# Patient Record
Sex: FEMALE | Race: WHITE | NOT HISPANIC OR LATINO | URBAN - METROPOLITAN AREA
[De-identification: names, ages, dates, MRNs, and addresses within clinical notes are randomized per-mention and may not be internally consistent; named-entity substitution may affect disease eponyms.]

---

## 2022-10-08 ENCOUNTER — HOSPITAL ENCOUNTER (EMERGENCY)
Facility: HOSPITAL | Age: 22
Discharge: HOME | End: 2022-10-08
Attending: EMERGENCY MEDICINE
Payer: COMMERCIAL

## 2022-10-08 ENCOUNTER — APPOINTMENT (OUTPATIENT)
Dept: RADIOLOGY | Facility: HOSPITAL | Age: 22
End: 2022-10-08
Payer: COMMERCIAL

## 2022-10-08 VITALS
BODY MASS INDEX: 30.53 KG/M2 | HEIGHT: 66 IN | WEIGHT: 190 LBS | TEMPERATURE: 98.6 F | HEART RATE: 97 BPM | OXYGEN SATURATION: 98 % | RESPIRATION RATE: 20 BRPM | SYSTOLIC BLOOD PRESSURE: 155 MMHG | DIASTOLIC BLOOD PRESSURE: 83 MMHG

## 2022-10-08 DIAGNOSIS — S93.401A RIGHT ANKLE SPRAIN: Primary | ICD-10-CM

## 2022-10-08 PROCEDURE — 73610 X-RAY EXAM OF ANKLE: CPT

## 2022-10-08 PROCEDURE — 99283 EMERGENCY DEPT VISIT LOW MDM: CPT

## 2022-10-08 PROCEDURE — 99284 EMERGENCY DEPT VISIT MOD MDM: CPT | Performed by: EMERGENCY MEDICINE

## 2022-10-08 RX ORDER — ACETAMINOPHEN 325 MG/1
975 TABLET ORAL ONCE
Status: COMPLETED | OUTPATIENT
Start: 2022-10-08 | End: 2022-10-08

## 2022-10-08 RX ORDER — IBUPROFEN 600 MG/1
600 TABLET ORAL ONCE
Status: COMPLETED | OUTPATIENT
Start: 2022-10-08 | End: 2022-10-08

## 2022-10-08 RX ADMIN — IBUPROFEN 600 MG: 600 TABLET ORAL at 19:05

## 2022-10-08 RX ADMIN — ACETAMINOPHEN 975 MG: 325 TABLET, FILM COATED ORAL at 19:05

## 2022-10-08 NOTE — ED PROVIDER NOTES
History  Chief Complaint   Patient presents with   • Ankle Injury     Brad Lesterwers down 2 steps, heard a crack and c/o pain in R ankle      25 y o F w/ no pertinent PMH presents after falling down stairs today w/ ankle pain  She was carrying a box down stairs when she missed a step and inverted her R ankle  She heard a crack and had immediate pain and inability to bear weight on the leg  She hasn't taken anything for the pain  It is relieved by sitting and keeping weight off of it  She denies other injuries suffered in the event  No history of surgeries on this ankle  No numbness, cold/white/blue toes, or other complaints  Of note she is planning to fly to [place]       History reviewed  No pertinent past medical history  Past Surgical History:   Procedure Laterality Date   • ADENOIDECTOMY     • TONSILLECTOMY     • WISDOM TOOTH EXTRACTION         History reviewed  No pertinent family history  I have reviewed and agree with the history as documented  E-Cigarette/Vaping   • E-Cigarette Use Current Every Day User      E-Cigarette/Vaping Substances   • Nicotine Yes      Social History     Tobacco Use   • Smoking status: Never Smoker   • Smokeless tobacco: Never Used   Vaping Use   • Vaping Use: Every day   • Substances: Nicotine   Substance Use Topics   • Alcohol use: Yes     Comment: social   • Drug use: Never        Review of Systems   Constitutional: Negative for chills and fever  HENT: Negative for ear pain and sore throat  Eyes: Negative for pain and visual disturbance  Respiratory: Negative for cough and shortness of breath  Cardiovascular: Negative for chest pain and palpitations  Gastrointestinal: Negative for abdominal pain and vomiting  Genitourinary: Negative for dysuria and hematuria  Musculoskeletal: Positive for joint swelling  Negative for arthralgias and back pain  Skin: Negative for color change and rash  Neurological: Negative for seizures and syncope     All other systems reviewed and are negative  Physical Exam  ED Triage Vitals   Temperature Pulse Respirations Blood Pressure SpO2   10/08/22 1821 10/08/22 1823 10/08/22 1823 10/08/22 1823 10/08/22 1823   98 6 °F (37 °C) 97 20 155/83 98 %      Temp src Heart Rate Source Patient Position - Orthostatic VS BP Location FiO2 (%)   -- -- -- -- --             Pain Score       10/08/22 1823       8             Orthostatic Vital Signs  Vitals:    10/08/22 1823   BP: 155/83   Pulse: 97       Physical Exam  Vitals and nursing note reviewed  Constitutional:       General: She is not in acute distress  Appearance: She is well-developed  HENT:      Head: Normocephalic and atraumatic  Eyes:      Conjunctiva/sclera: Conjunctivae normal    Cardiovascular:      Rate and Rhythm: Normal rate and regular rhythm  Pulses: Normal pulses  Heart sounds: No murmur heard  Pulmonary:      Effort: Pulmonary effort is normal  No respiratory distress  Breath sounds: Normal breath sounds  Abdominal:      Palpations: Abdomen is soft  Tenderness: There is no abdominal tenderness  Musculoskeletal:         General: Swelling (R ankle) and tenderness (lateral malleolus of R ankle) present  Cervical back: Neck supple  Comments: No metacarpal tenderness or proximal tib/fib tenderness  Skin:     General: Skin is warm and dry  Neurological:      Mental Status: She is alert  Sensory: No sensory deficit  Psychiatric:         Mood and Affect: Mood normal          ED Medications  Medications   acetaminophen (TYLENOL) tablet 975 mg (975 mg Oral Given 10/8/22 1905)   ibuprofen (MOTRIN) tablet 600 mg (600 mg Oral Given 10/8/22 1905)       Diagnostic Studies  Results Reviewed     None                 XR ankle 3+ views RIGHT   Final Result by Kel Christie DO (10/09 1119)      No acute osseous abnormality              Workstation performed: [id number]               Procedures  Splint application    Date/Time: 10/8/2022 7:05 PM  Performed by: Merlin Rogers MD  Authorized by: Merlin Rogers MD   Universal Protocol:  Consent: Verbal consent obtained  Consent given by: patient  Patient identity confirmed: verbally with patient      Pre-procedure details:     Sensation:  Normal  Procedure details:     Laterality:  Right    Location:  Ankle    Ankle:  R ankle    Splint type: aircast stirrup  Supplies used: aircast   Post-procedure details:     Pain:  Unchanged    Sensation:  Normal    Patient tolerance of procedure: Tolerated well, no immediate complications          ED Course                                       MDM  Number of Diagnoses or Management Options  Right ankle sprain  Diagnosis management comments: 25 y o F w/ R ankle pain after rolling the ankle  Due to inability to bear weight and pain over lateral malleolus, will obtain Xray to evaluate for fracture  Symptom management provided  Xray showing no acute fractures  Likely sprained but cannot rule out tendinous tear or other soft tissue injuries  Discussed this all with patient  Offered splinting and crutches which patient elected for due to inability to bear weight on the foot  Patient placed in aircast splint and discharged with crutches and ortho f/u  Disposition  Final diagnoses:   Right ankle sprain     Time reflects when diagnosis was documented in both MDM as applicable and the Disposition within this note     Time User Action Codes Description Comment    10/8/2022  7:20 PM Esha Murphy Add [[id number]] Right ankle sprain       ED Disposition     ED Disposition   Discharge    Condition   Stable    Date/Time   Sat Oct 8, 2022  7:20 PM    Comment   Bobbi Mcleod discharge to home/self care                 Follow-up Information     Follow up With Specialties Details Why Contact Info Additional [id number] [facility]   [address] [place] [id number] [phone] [address] [facility] [address] [place], [place], [address]  Use Entrance A           There are no discharge medications for this patient  PDMP Review     None           ED Provider  Attending physically available and evaluated [facility]  I managed the patient along with the ED Attending      Electronically Signed by         Courtney Winter MD  10/09/22 6019

## 2022-10-08 NOTE — ED ATTENDING ATTESTATION
10/8/2022  Syd Swift DO, saw and evaluated the patient  I have discussed the patient with the resident/non-physician practitioner and agree with the resident's/non-physician practitioner's findings, Plan of Care, and MDM as documented in the resident's/non-physician practitioner's note, except where noted  All available labs and Radiology studies were reviewed  I was present for key portions of any procedure(s) performed by the resident/non-physician practitioner and I was immediately available to provide assistance  At this point I agree with the current assessment done in the Emergency Department  I have conducted an independent evaluation of this patient a history and physical is as follows:    26 yo female c/o R lateral ankle pain s/p fall down 2 steps  No focal weakness/numbness/tingling  Pain constant, non radiating, moderate severity  Worse with palpation, attempted movement or ambulation  No pain over the knee or foot  RLE:  TTP over lateral malleolus  Skin intact  No TTP over proximal fibular head  No TTP over navicular  No TTP over base of 5th MT  SILT S2  2+ DP    Imp: R ankle injury, likely sprain vs fx plan: films, analgesia, reassess  Sprain -> aircast; fx - > reduce, OA splint vs SPS if isolated to fibula        ED Course         Critical Care Time  Procedures

## 2022-10-08 NOTE — Clinical Note
Mandy Hall was seen and treated in our emergency department on 10/8/2022  Diagnosis:     Vera    She may return on this date: 10/12/2022         If you have any questions or concerns, please don't hesitate to call        Lawrence Koo MD    ______________________________           _______________          _______________  Hospital Representative                              Date                                Time

## 2022-10-24 ENCOUNTER — OFFICE VISIT (OUTPATIENT)
Dept: OBGYN CLINIC | Facility: CLINIC | Age: 22
End: 2022-10-24
Payer: COMMERCIAL

## 2022-10-24 VITALS
HEIGHT: 66 IN | DIASTOLIC BLOOD PRESSURE: 82 MMHG | SYSTOLIC BLOOD PRESSURE: 134 MMHG | BODY MASS INDEX: 32.14 KG/M2 | WEIGHT: 200 LBS

## 2022-10-24 DIAGNOSIS — S93.401A SPRAIN OF UNSPECIFIED LIGAMENT OF RIGHT ANKLE, INITIAL ENCOUNTER: Primary | ICD-10-CM

## 2022-10-24 PROCEDURE — 99203 OFFICE O/P NEW LOW 30 MIN: CPT | Performed by: PHYSICIAN ASSISTANT

## 2022-10-24 RX ORDER — MELOXICAM 15 MG/1
15 TABLET ORAL DAILY
Qty: 30 TABLET | Refills: 0 | Status: SHIPPED | OUTPATIENT
Start: 2022-10-24

## 2022-10-24 RX ORDER — ALBUTEROL SULFATE 90 UG/1
AEROSOL, METERED RESPIRATORY (INHALATION)
COMMUNITY
Start: 2022-06-20

## 2022-10-24 NOTE — PROGRESS NOTES
Patient Name:  Mitesh Buregr  MRN:  [id number]    Assessment & Plan     Right Ankle Sprain 10/8/22  1  Referral to physical therapy  2  Prescription for meloxicam   3  Weight bearing as tolerated right lower extremity  4  Gradually return to normal activities as tolerated  5  Work note provided  6  Follow up in 6 weeks with primary care sports medicine  Chief Complaint     Right ankle injury    History of the Present Illness     Vera Sneed is a 25 y o  female who reports to the office today for evaluation of her right ankle  Patient sustained an inversion-type injury to her right ankle on 10/8/22 while walking down steps  She states she felt a pop in the ankle and noted an onset of pain and swelling along the lateral aspect of the ankle  Pain was worse with bearing weight ambulating  She did report to urgent care where x-rays were performed and she was placed in an Aircast   Since the initial injury he does note improvement in her pain  She still notes persistent discomfort and swelling along the lateral aspect of the ankle worse with bearing weight  No weakness or instability  No numbness or tingling  No fevers or chills  She is taking over-the-counter anti-inflammatories and utilizing a compressive ankle wrap  Physical Exam     /82   Ht 5' 6" (1 676 m)   Wt 90 7 kg (200 lb)   BMI 32 28 kg/m²     Right ankle:  No gross deformity  Skin intact  No erythema or ecchymosis  Soft tissue swelling appreciated laterally  There is no tenderness to palpation lateral malleolus and medial malleolus  Tenderness to palpation ATFL  Tenderness to palpation peroneal tendons as well  No tenderness to palpation Achilles tendon, deltoid ligaments, midfoot, forefoot, and Achilles tendon  Ankle range of motion is intact and nearly full with minimal discomfort  Positive talar tilt test   Negative anterior drawer test   Toes warm and mobile  Eyes: Anicteric sclerae  ENT: Trachea midline    Lungs: Normal respiratory effort  CV: Capillary refill is less than 2 seconds  Skin: Intact without erythema  Lymph: No palpable lymphadenopathy  Neuro: Sensation is grossly intact to light touch  Psych: Mood and affect are appropriate  Data Review     I have personally reviewed pertinent films in PACS, and my interpretation follows:    X-rays right ankle 10/8/22: No acute osseous abnormalities  No fracture or dislocation  No significant degenerative changes  Soft tissue swelling appreciated over the lateral malleolus  No past medical history on file  Past Surgical History:   Procedure Laterality Date   • ADENOIDECTOMY     • TONSILLECTOMY     • WISDOM TOOTH EXTRACTION         Allergies   Allergen Reactions   • Medical Tape Rash   • Shrimp (Diagnostic) - Food Allergy Anaphylaxis   • Penicillins Rash       Current Outpatient Medications on File Prior to Visit   Medication Sig Dispense Refill   • albuterol (PROVENTIL HFA,VENTOLIN HFA) 90 mcg/act inhaler 2 inh prn sob     • sertraline (ZOLOFT) 50 mg tablet Take 50 mg by mouth daily       No current facility-administered medications on file prior to visit  Social History     Tobacco Use   • Smoking status: Never Smoker   • Smokeless tobacco: Never Used   Vaping Use   • Vaping Use: Every day   • Substances: Nicotine   Substance Use Topics   • Alcohol use: Yes     Comment: social   • Drug use: Never       No family history on file  Review of Systems     As stated in the HPI  All other systems reviewed and are negative

## 2022-10-24 NOTE — LETTER
October 24, 2022     Patient: Radha Prado  YOB: 2000  Date of Visit: 10/24/2022      To Whom it May Concern:    Radha Prado is under my professional care  Vera was seen in my office on 10/24/2022  Patient is out of work from 10/17/22 until 10/31/22  She may return to work on 10/31/22 without restrictions  If you have any questions or concerns, please don't hesitate to call           Sincerely,          Yudy Walters PA-C

## 2024-10-15 ENCOUNTER — APPOINTMENT (OUTPATIENT)
Dept: RADIOLOGY | Age: 24
End: 2024-10-15
Payer: COMMERCIAL

## 2024-10-15 ENCOUNTER — OFFICE VISIT (OUTPATIENT)
Dept: URGENT CARE | Age: 24
End: 2024-10-15
Payer: COMMERCIAL

## 2024-10-15 VITALS
WEIGHT: 195 LBS | OXYGEN SATURATION: 98 % | DIASTOLIC BLOOD PRESSURE: 86 MMHG | SYSTOLIC BLOOD PRESSURE: 132 MMHG | HEIGHT: 67 IN | RESPIRATION RATE: 20 BRPM | BODY MASS INDEX: 30.61 KG/M2 | TEMPERATURE: 99.8 F | HEART RATE: 108 BPM

## 2024-10-15 DIAGNOSIS — R05.1 ACUTE COUGH: ICD-10-CM

## 2024-10-15 DIAGNOSIS — J06.9 VIRAL URI WITH COUGH: Primary | ICD-10-CM

## 2024-10-15 LAB
SARS-COV-2 AG UPPER RESP QL IA: NEGATIVE
VALID CONTROL: NORMAL

## 2024-10-15 PROCEDURE — 71046 X-RAY EXAM CHEST 2 VIEWS: CPT

## 2024-10-15 PROCEDURE — 87811 SARS-COV-2 COVID19 W/OPTIC: CPT | Performed by: STUDENT IN AN ORGANIZED HEALTH CARE EDUCATION/TRAINING PROGRAM

## 2024-10-15 RX ORDER — OMEGA-3S/DHA/EPA/FISH OIL/D3 300MG-1000
400 CAPSULE ORAL DAILY
COMMUNITY

## 2024-10-15 RX ORDER — MULTIVITAMIN
1 TABLET ORAL DAILY
COMMUNITY

## 2024-10-15 RX ORDER — PHENOL 1.4 %
600 AEROSOL, SPRAY (ML) MUCOUS MEMBRANE DAILY
COMMUNITY

## 2024-10-16 ENCOUNTER — TELEPHONE (OUTPATIENT)
Dept: URGENT CARE | Age: 24
End: 2024-10-16

## 2024-10-16 DIAGNOSIS — J18.9 COMMUNITY ACQUIRED PNEUMONIA OF LEFT LOWER LOBE OF LUNG: Primary | ICD-10-CM

## 2024-10-16 RX ORDER — AZITHROMYCIN 250 MG/1
TABLET, FILM COATED ORAL
Qty: 6 TABLET | Refills: 0 | Status: SHIPPED | OUTPATIENT
Start: 2024-10-16 | End: 2024-10-20

## 2024-10-16 NOTE — PATIENT INSTRUCTIONS
I do not see an acute abnormality on chest x-ray. The radiologist will also read your chest xray , we will call with any changes to the plan.  Your heart rate is a little bit fast, this can come from fever, dehydration.  You do appear to be a little bit dehydrated.  Your temperature is also elevated in the office at nine 99.8 Fahrenheit.  You have a good oxygen level.  I recommend continue with Motrin, Tylenol for fevers.  You may use plain Mucinex to help with mucus and cough.  I recommend nasal saline spray to help reduce postnasal drip which can contribute to the cough.  You can also use Flonase nasal spray.  We discussed that it is safe to hold off on going to the ER now as your lightheadedness is getting better.  However, if you feel that you are having any worsening of lightheadedness, shortness of breath, palpitations, please go to the ER or call 911 for further evaluation.

## 2024-10-16 NOTE — TELEPHONE ENCOUNTER
Reviewed CXR results with patient.     Impression: Slightly increased opacity in the left base on the frontal projection, question early pneumonia.     Patient started on antibiotic therapy.     Patient verbalized understanding to plan of care, all questions and concerns were addressed.

## 2024-10-16 NOTE — PROGRESS NOTES
[facility]        NAME: Vera Hudson is a 24 y.o. female  : 2000    MRN: [id number]  DATE: October 15, 2024  TIME: 9:30 PM    Assessment and Plan   Viral URI with cough [J06.9]  1. Viral URI with cough        2. Acute cough  XR chest pa and lateral    Poct Covid 19 Rapid Antigen Test            Patient Instructions   I do not see an acute abnormality on chest x-ray.  The radiologist will also read your chest xray , we will call with any changes to the plan.  Your heart rate is a little bit fast, this can come from fever, dehydration.  You do appear to be a little bit dehydrated.  Your temperature is also elevated in the office at 99.8 Fahrenheit.  You have a good oxygen level.  I recommend continue with Motrin, Tylenol for fevers.  You may use plain Mucinex to help with mucus and cough.  I recommend nasal saline spray to help reduce postnasal drip which can contribute to the cough.  You can also use Flonase nasal spray.  We discussed that it is safe to hold off on going to the ER now as your lightheadedness is getting better.  However, if you feel that you are having any worsening of lightheadedness, shortness of breath, palpitations, please go to the ER or call 911 for further evaluation.      Chief Complaint     Chief Complaint   Patient presents with    Cold Like Symptoms     Per patient, her symptoms started yesterday. She has a cough and chest pain with cough and a deep breath, like her daughter is sitting on her chest. Patient has a productive yellowish cough         History of Present Illness       Patient presents with her friend for evaluation of symptoms that started yesterday.  Started with cough, fever with Tmax of 101F, feeling lightheaded.  She notes the lightheadedness was worse yesterday, felt similar to when she had syncopal episodes in the past.  She was having difficulty picking up her daughter to care for her.  Today, the lightheadedness is feeling better but she continues with  "strong cough, chest pain and tightness with coughing episodes and with attempting to take a deep breath.  She took Tylenol Motrin yesterday, no meds today so far.  Has been trying to keep up with drinking Gatorade today.        Review of Systems   Review of Systems      Current Medications       Current Outpatient Medications:     albuterol (PROVENTIL HFA,VENTOLIN HFA) 90 mcg/act inhaler, 2 inh prn sob, Disp: , Rfl:     calcium carbonate (OS-ROBERT) 600 MG tablet, Take 600 mg by mouth daily, Disp: , Rfl:     cholecalciferol (VITAMIN D3) 400 units tablet, Take 400 Units by mouth daily, Disp: , Rfl:     Multiple Vitamin (multivitamin) tablet, Take 1 tablet by mouth daily Prenatal vitamins, Disp: , Rfl:     meloxicam (Mobic) 15 mg tablet, Take 1 tablet (15 mg total) by mouth daily (Patient not taking: Reported on 10/15/2024), Disp: 30 tablet, Rfl: 0    sertraline (ZOLOFT) 50 mg tablet, Take 50 mg by mouth daily (Patient not taking: Reported on 10/15/2024), Disp: , Rfl:     Current Allergies     Allergies as of 10/15/2024 - Reviewed 10/24/2022   Allergen Reaction Noted    Medical tape Rash 05/12/2022    Shrimp (diagnostic) - food allergy Anaphylaxis 10/24/2022    Penicillins Rash 10/08/2022            The following portions of the patient's history were reviewed and updated as appropriate: allergies, current medications, past family history, past medical history, past social history, past surgical history and problem list.     History reviewed. No pertinent past medical history.    Past Surgical History:   Procedure Laterality Date    ADENOIDECTOMY      TONSILLECTOMY      WISDOM TOOTH EXTRACTION         No family history on file.      Medications have been verified.        Objective   /86 (BP Location: Left arm, Patient Position: Sitting)   Pulse (!) 116   Temp 99.8 °F (37.7 °C) (Oral)   Resp 20   Ht 5' 7\" (1.702 m)   Wt 88.5 kg (195 lb)   LMP 09/24/2024 (Approximate)   SpO2 98%   Breastfeeding Yes   BMI " 30.54 kg/m²   Patient's last menstrual period was 09/24/2024 (approximate).       Physical Exam     Physical Exam  Vitals and nursing note reviewed.   Constitutional:       General: She is not in acute distress.     Appearance: Normal appearance. She is not toxic-appearing.   HENT:      Head: Normocephalic and atraumatic.      Right Ear: Tympanic membrane, ear canal and external ear normal.      Left Ear: Tympanic membrane, ear canal and external ear normal.      Nose: Congestion and rhinorrhea present.      Mouth/Throat:      Comments: Mild erythema, PND  Dry lips  Eyes:      Extraocular Movements: Extraocular movements intact.   Cardiovascular:      Rate and Rhythm: Normal rate and regular rhythm.      Heart sounds: Normal heart sounds.   Pulmonary:      Effort: Pulmonary effort is normal. No respiratory distress.      Breath sounds: Normal breath sounds. No stridor. No wheezing, rhonchi or rales.   Skin:     General: Skin is warm and dry.      Capillary Refill: Capillary refill takes less than 2 seconds.      Findings: No rash.   Neurological:      Mental Status: She is alert.      Gait: Gait normal.   Psychiatric:         Behavior: Behavior normal.